# Patient Record
Sex: FEMALE | Race: WHITE | Employment: FULL TIME | URBAN - METROPOLITAN AREA
[De-identification: names, ages, dates, MRNs, and addresses within clinical notes are randomized per-mention and may not be internally consistent; named-entity substitution may affect disease eponyms.]

---

## 2020-07-25 ENCOUNTER — HOSPITAL ENCOUNTER (EMERGENCY)
Age: 50
Discharge: HOME | End: 2020-07-25
Attending: EMERGENCY MEDICINE
Payer: COMMERCIAL

## 2020-07-25 ENCOUNTER — APPOINTMENT (OUTPATIENT)
Dept: ULTRASOUND IMAGING | Age: 50
End: 2020-07-25
Payer: COMMERCIAL

## 2020-07-25 VITALS
SYSTOLIC BLOOD PRESSURE: 130 MMHG | RESPIRATION RATE: 15 BRPM | HEIGHT: 64 IN | OXYGEN SATURATION: 97 % | WEIGHT: 160 LBS | TEMPERATURE: 98.4 F | HEART RATE: 76 BPM | BODY MASS INDEX: 27.31 KG/M2 | DIASTOLIC BLOOD PRESSURE: 79 MMHG

## 2020-07-25 PROCEDURE — 99283 EMERGENCY DEPT VISIT LOW MDM: CPT

## 2020-07-25 PROCEDURE — 93971 EXTREMITY STUDY: CPT

## 2020-07-25 RX ORDER — CETIRIZINE HYDROCHLORIDE 10 MG/1
10 TABLET ORAL DAILY
COMMUNITY

## 2020-07-25 RX ORDER — ALBUTEROL SULFATE 90 UG/1
2 AEROSOL, METERED RESPIRATORY (INHALATION) EVERY 6 HOURS PRN
COMMUNITY

## 2020-07-25 ASSESSMENT — ENCOUNTER SYMPTOMS
NAUSEA: 0
ABDOMINAL PAIN: 0
COUGH: 0
SORE THROAT: 0
EYE PAIN: 0
SHORTNESS OF BREATH: 0
VOMITING: 0
DIARRHEA: 0
RHINORRHEA: 0
BACK PAIN: 0

## 2020-07-25 ASSESSMENT — PAIN DESCRIPTION - ORIENTATION: ORIENTATION: RIGHT

## 2020-07-25 ASSESSMENT — PAIN SCALES - GENERAL: PAINLEVEL_OUTOF10: 3

## 2020-07-25 ASSESSMENT — PAIN DESCRIPTION - LOCATION: LOCATION: LEG

## 2020-07-25 ASSESSMENT — PAIN DESCRIPTION - DESCRIPTORS: DESCRIPTORS: NUMBNESS;TINGLING

## 2020-07-26 NOTE — ED PROVIDER NOTES
[address] [facility] ED  [address] [facility] [address]  Phone: [phone]  Fax: [phone] [facility]          Pt Name: Jesse Farncis  MRN: [id number]  [place] 1970  Date of evaluation: 7/25/2020      CHIEF COMPLAINT       Chief Complaint   Patient presents with    Leg Pain     patient sts she was in the car traveling 8 hours. sts took 2 baby asa today. reports swelling and numbness to right leg. reports swelling bilat legs.  Leg Swelling       HISTORY OF PRESENT ILLNESS       Jesse Francis is a 54 y.o. female who presents with right leg edema and posterior knee knee pain. On Wednesday they went to [place] and return back today. She reports when she got out of the car both legs were swollen. The left went down but the right has not. Feels tight. No history of DVT or PE. Denies any trauma. States she was very active and went white water rafting and hiking on the trip to [place]. She denies other symptoms or concerns including no dyspnea or chest pain. REVIEW OF SYSTEMS       Review of Systems   Constitutional: Negative for chills, fatigue and fever. HENT: Negative for rhinorrhea and sore throat. Eyes: Negative for pain. Respiratory: Negative for cough and shortness of breath. Cardiovascular: Positive for leg swelling. Negative for chest pain and palpitations. Gastrointestinal: Negative for abdominal pain, diarrhea, nausea and vomiting. Genitourinary: Negative for difficulty urinating. Musculoskeletal: Negative for back pain and neck pain. Skin: Negative for rash. Neurological: Negative for weakness and headaches. PAST MEDICAL HISTORY    has a past medical history of Asthma and Migraine. SURGICAL HISTORY      has a past surgical history that includes Nasal fracture surgery.     CURRENT MEDICATIONS       Discharge Medication List as of 7/25/2020 11:08 PM      CONTINUE these medications which have NOT CHANGED    Details cetirizine (ZYRTEC) 10 MG tablet Take 10 mg by mouth dailyHistorical Med      albuterol sulfate HFA (VENTOLIN HFA) 108 (90 Base) MCG/ACT inhaler Inhale 2 puffs into the lungs every 6 hours as needed for WheezingHistorical Med             ALLERGIES     is allergic to sulfa antibiotics. FAMILY HISTORY     has no family status information on file. family history is not on file. SOCIAL HISTORY      reports that she has never smoked. She has never used smokeless tobacco. She reports current alcohol use. She reports that she does not use drugs. PHYSICAL EXAM     INITIAL VITALS:  height is 5' 4\" (1.626 m) and weight is 72.6 kg (160 lb). Her oral temperature is 98.4 °F (36.9 °C). Her blood pressure is 130/79 and her pulse is 76. Her respiration is 15 and oxygen saturation is 97%. Physical Exam   Constitutional: She appears well-developed and well-nourished. Non-toxic appearance. She does not appear ill. No distress. HENT:   Head: Normocephalic and atraumatic. Right Ear: External ear normal.   Left Ear: External ear normal.   Eyes: EOM and lids are normal.   Neck: No tracheal deviation present. Cardiovascular: Normal rate and regular rhythm. Pulmonary/Chest: Effort normal and breath sounds normal. No respiratory distress. Abdominal: Soft. There is no abdominal tenderness. Musculoskeletal:      Comments: I do not appreciate any obvious significant edema from the right compared to the left. Posterior knee is slightly tender. Calf is mildly tender on the right. Left leg unremarkable. No pitting edema. Otherwise unremarkable leg exam.  Normal distal pulses, motor and sensation. Neurological: She is alert. GCS eye subscore is 4. GCS verbal subscore is 5. GCS motor subscore is 6. Skin: Skin is warm and dry. Psychiatric: She has a normal mood and affect. Her speech is normal.   Vitals reviewed. DIFFERENTIAL DIAGNOSIS/ MDM:     Plan to be to check a DVT study of the legs. Suspicion low for DVT but will need to rule out. DIAGNOSTIC RESULTS     EKG: All EKG's are interpreted by the Emergency Department Physician who either signs or Co-signs this chart in the absence of a cardiologist.        RADIOLOGY:   I directly visualized the following  images and reviewed the radiologist interpretations:  US DUP LOWER EXTREMITY LEFT BERRY   Preliminary Result   No evidence of DVT in the bilateral lower extremities. US DUP LOWER EXTREMITY RIGHT BERRY   Preliminary Result   No evidence of DVT in the bilateral lower extremities. No results found. LABS:  No results found for this visit on 07/25/20. EMERGENCY DEPARTMENT COURSE:     The patient was given the following medications:  No orders of the defined types were placed in this encounter. Vitals:    Vitals:    07/25/20 2105   BP: 130/79   Pulse: 76   Resp: 15   Temp: 98.4 °F (36.9 °C)   TempSrc: Oral   SpO2: 97%   Weight: 72.6 kg (160 lb)   Height: 5' 4\" (1.626 m)     -------------------------  BP: 130/79, Temp: 98.4 °F (36.9 °C), Pulse: 76, Resp: 15      Re-evaluation Notes             OARRS    Patient doing well on reevaluation. No acute changes. Imaging negative for bilateral DVTs. Advised follow-up with her PCP return sooner if worse. Suspicion low for  DVT not detected on this image however she may need repeat study if not improving. Advised to elevate her legs and reassess in the morning. I do not feel she has a PE or require CT scan of the chest.  She is comfortable with this plan. The patient understands that at this time there is no evidence for a more malignant underlying process, but the patient also understands that early in the process of an illness or injury, an emergency department workup can be falsely reassuring.   Routine discharge counseling was given, and the patient understands that worsening, changing or persistent symptoms should prompt an immediate call or follow up with their primary physician or return to the emergency department. The importance of appropriate follow up was also discussed. I have reviewed the disposition diagnosis with the patient and or their family/guardian. I have answered their questions and given discharge instructions. They voiced understanding of these instructions and did not have any further questions or complaints. CONSULTS:    None    CRITICAL CARE:     None    PROCEDURES:    None    FINAL IMPRESSION      1. Leg swelling    2.  Left leg pain          DISPOSITION/PLAN   DISPOSITION Decision To Discharge 07/25/2020 11:08:10 PM      Condition on Disposition    Improved    PATIENT REFERRED TO:  Samuel Gallagher  Schedule an appointment as soon as possible for a visit in 3 days        DISCHARGE MEDICATIONS:  Discharge Medication List as of 7/25/2020 11:08 PM          (Please note that portions of this note were completed with a voice recognition program.  Efforts were made to edit the dictations but occasionally words are mis-transcribed.)    Justus Chavez DO  Attending Emergency Physician       Justus Chavez DO  07/26/20 8710

## 2021-07-29 ENCOUNTER — HOSPITAL ENCOUNTER (EMERGENCY)
Age: 51
Discharge: HOME | End: 2021-07-29
Attending: EMERGENCY MEDICINE
Payer: COMMERCIAL

## 2021-07-29 VITALS
OXYGEN SATURATION: 97 % | DIASTOLIC BLOOD PRESSURE: 66 MMHG | BODY MASS INDEX: 24.75 KG/M2 | HEIGHT: 64 IN | TEMPERATURE: 97.3 F | RESPIRATION RATE: 16 BRPM | SYSTOLIC BLOOD PRESSURE: 106 MMHG | WEIGHT: 145 LBS | HEART RATE: 75 BPM

## 2021-07-29 DIAGNOSIS — T78.40XA ALLERGIC REACTION, INITIAL ENCOUNTER: Primary | ICD-10-CM

## 2021-07-29 PROCEDURE — 6360000002 HC RX W HCPCS: Performed by: EMERGENCY MEDICINE

## 2021-07-29 PROCEDURE — 96375 TX/PRO/DX INJ NEW DRUG ADDON: CPT

## 2021-07-29 PROCEDURE — 99285 EMERGENCY DEPT VISIT HI MDM: CPT

## 2021-07-29 PROCEDURE — 2500000003 HC RX 250 WO HCPCS: Performed by: EMERGENCY MEDICINE

## 2021-07-29 PROCEDURE — 96374 THER/PROPH/DIAG INJ IV PUSH: CPT

## 2021-07-29 PROCEDURE — 6370000000 HC RX 637 (ALT 250 FOR IP): Performed by: EMERGENCY MEDICINE

## 2021-07-29 RX ORDER — FAMOTIDINE 20 MG/1
20 TABLET, FILM COATED ORAL 2 TIMES DAILY
Qty: 60 TABLET | Refills: 0 | Status: SHIPPED | OUTPATIENT
Start: 2021-07-29

## 2021-07-29 RX ORDER — DIPHENHYDRAMINE HYDROCHLORIDE 50 MG/ML
25 INJECTION INTRAMUSCULAR; INTRAVENOUS ONCE
Status: COMPLETED | OUTPATIENT
Start: 2021-07-29 | End: 2021-07-29

## 2021-07-29 RX ORDER — METHYLPREDNISOLONE SODIUM SUCCINATE 125 MG/2ML
125 INJECTION, POWDER, LYOPHILIZED, FOR SOLUTION INTRAMUSCULAR; INTRAVENOUS ONCE
Status: COMPLETED | OUTPATIENT
Start: 2021-07-29 | End: 2021-07-29

## 2021-07-29 RX ORDER — PREDNISONE 20 MG/1
20 TABLET ORAL 2 TIMES DAILY
Qty: 20 TABLET | Refills: 0 | Status: SHIPPED | OUTPATIENT
Start: 2021-07-29 | End: 2021-08-08

## 2021-07-29 RX ORDER — IBUPROFEN 200 MG
400 TABLET ORAL ONCE
Status: COMPLETED | OUTPATIENT
Start: 2021-07-29 | End: 2021-07-29

## 2021-07-29 RX ADMIN — FAMOTIDINE 20 MG: 10 INJECTION, SOLUTION INTRAVENOUS at 19:52

## 2021-07-29 RX ADMIN — IBUPROFEN 400 MG: 200 TABLET, FILM COATED ORAL at 20:50

## 2021-07-29 RX ADMIN — METHYLPREDNISOLONE SODIUM SUCCINATE 125 MG: 125 INJECTION, POWDER, FOR SOLUTION INTRAMUSCULAR; INTRAVENOUS at 19:52

## 2021-07-29 RX ADMIN — DIPHENHYDRAMINE HYDROCHLORIDE 25 MG: 50 INJECTION, SOLUTION INTRAMUSCULAR; INTRAVENOUS at 19:52

## 2021-07-29 ASSESSMENT — ENCOUNTER SYMPTOMS
COLOR CHANGE: 0
EYE PAIN: 0
STRIDOR: 0
SORE THROAT: 0
CONSTIPATION: 0
WHEEZING: 0
EYE REDNESS: 0
COUGH: 0
DIARRHEA: 0
EYE DISCHARGE: 0
SHORTNESS OF BREATH: 0
VOMITING: 0
NAUSEA: 0
ABDOMINAL PAIN: 0

## 2021-07-29 ASSESSMENT — PAIN SCALES - GENERAL: PAINLEVEL_OUTOF10: 4

## 2021-07-29 NOTE — ED PROVIDER NOTES
[address] [facility] ED  EMERGENCY DEPARTMENT ENCOUNTER      Pt Name: Olu Pope  MRN: [id number]  [place] 1970  Date of evaluation: 7/29/2021  Provider: Carol Ann Madrid MD    CHIEF COMPLAINT       Chief Complaint   Patient presents with    Other     states she was lightheaded and diaphoretic s/p eating at [employer], was eating a salmon salad- recently diagnosed with food allergies       HISTORY OF PRESENT ILLNESS  (Location/Symptom, Timing/Onset, Context/Setting, Quality, Duration, Modifying Factors, Severity.)   Olu Pope is a 52 y.o. female who presents to the emergency department complaining of what she suspects may be a food allergy. She was lightheaded and diaphoretic after eating at [employer]. She was eating a salmon salad. She has recently been diagnosed with several food allergies. She did have dental work done today though as well. She relates she is feeling much better now than when she initially had the episode. EMS started an IV and started some fluids on her. She relates her blood pressure was also low at the time. Nursing Notes were reviewed. REVIEW OF SYSTEMS    (2-9 systems for level 4, 10 or more for level 5)     Review of Systems   Constitutional: Negative for activity change, appetite change, chills, fatigue and fever. HENT: Negative for congestion, ear pain and sore throat. Eyes: Negative for pain, discharge and redness. Respiratory: Negative for cough, shortness of breath, wheezing and stridor. Cardiovascular: Negative for chest pain. Gastrointestinal: Negative for abdominal pain, constipation, diarrhea, nausea and vomiting. Genitourinary: Negative for decreased urine volume and difficulty urinating. Musculoskeletal: Negative for arthralgias and myalgias. Skin: Negative for color change and rash. Neurological: Positive for dizziness and light-headedness. Negative for weakness and headaches.    Psychiatric/Behavioral: Negative for behavioral Normal heart sounds. No murmur heard. Pulmonary:      Effort: Pulmonary effort is normal. No respiratory distress. Breath sounds: Normal breath sounds. No wheezing, rhonchi or rales. Chest:      Chest wall: No tenderness. Abdominal:      General: Bowel sounds are normal. There is no distension. Palpations: Abdomen is soft. There is no mass. Tenderness: There is no abdominal tenderness. There is no guarding or rebound. Musculoskeletal:         General: Normal range of motion. Cervical back: Normal range of motion and neck supple. Right lower leg: No edema. Left lower leg: No edema. Skin:     General: Skin is warm. Findings: No rash. Neurological:      General: No focal deficit present. Mental Status: She is alert and oriented to person, place, and time. Motor: No abnormal muscle tone. Psychiatric:         Mood and Affect: Mood normal.         Behavior: Behavior normal.         DIAGNOSTIC RESULTS     EKG: All EKG's are interpreted by the Emergency Department Physician who either signs or Co-signs this chart in the absence of a cardiologist.    none    RADIOLOGY:   Non-plain film images such as CT, Ultrasound and MRI are read by the radiologist. Plain radiographic images are visualized and preliminarily interpreted by the emergency physician with the below findings:    Interpretation per the Radiologist below, if available at the time of this note:    No orders to display         ED BEDSIDE ULTRASOUND:   Performed by ED Physician - none    LABS:  Labs Reviewed - No data to display    All other labs were within normal range or not returned as of this dictation.     EMERGENCY DEPARTMENT COURSE and DIFFERENTIAL DIAGNOSIS/MDM:   Vitals:    Vitals:    07/29/21 1950 07/29/21 2000 07/29/21 2015 07/29/21 2040   BP: (!) 113/57 111/69 104/65 106/66   Pulse:       Resp:       Temp:       TempSrc:       SpO2:       Weight:       Height:         We did discuss treating this as if it was an allergic reaction. She is feeling better but we will go ahead and give her some medications IV she is agreeable. I have answered any questions she has at this time. Is feeling better on reevaluation and asking to go home. CONSULTS:  None    PROCEDURES:  None    FINAL IMPRESSION      1. Allergic reaction, initial encounter          DISPOSITION/PLAN   DISPOSITION  home      PATIENT REFERRED TO:  No follow-up provider specified.     DISCHARGE MEDICATIONS:  New Prescriptions    FAMOTIDINE (PEPCID) 20 MG TABLET    Take 1 tablet by mouth 2 times daily    PREDNISONE (DELTASONE) 20 MG TABLET    Take 1 tablet by mouth 2 times daily for 10 days       (Please note that portions of this note were completed with a voice recognition program.  Efforts were made to edit the dictations but occasionally words are mis-transcribed.)    Antonio Sun MD  Attending Emergency Physician        Antonio Sun MD  07/29/21 6559

## 2021-07-30 NOTE — ED NOTES
Pt presents to ED via squad with concerns for allergic reaction to food. Pt states she was at [employer] a salmon salad and when she suddenly became lightheaded and felt faint. EMS reports she was pale and diaphoretic upon arrival. Pt states she had a similar reaction when she had allergy testing done. She is alert and oriented x4. IVF infusing. LEONARDO Atkinson RN  07/29/21 2028

## 2021-10-18 ENCOUNTER — APPOINTMENT (OUTPATIENT)
Dept: GENERAL RADIOLOGY | Age: 51
End: 2021-10-18
Payer: COMMERCIAL

## 2021-10-18 ENCOUNTER — HOSPITAL ENCOUNTER (EMERGENCY)
Age: 51
Discharge: HOME | End: 2021-10-18
Attending: EMERGENCY MEDICINE
Payer: COMMERCIAL

## 2021-10-18 VITALS
HEART RATE: 84 BPM | WEIGHT: 145 LBS | HEIGHT: 64 IN | BODY MASS INDEX: 24.75 KG/M2 | OXYGEN SATURATION: 100 % | DIASTOLIC BLOOD PRESSURE: 79 MMHG | SYSTOLIC BLOOD PRESSURE: 102 MMHG | TEMPERATURE: 97.1 F | RESPIRATION RATE: 16 BRPM

## 2021-10-18 DIAGNOSIS — S63.283A DISLOCATION OF PROXIMAL INTERPHALANGEAL JOINT OF LEFT MIDDLE FINGER, INITIAL ENCOUNTER: ICD-10-CM

## 2021-10-18 DIAGNOSIS — S62.624A CLOSED DISPLACED FRACTURE OF MIDDLE PHALANX OF RIGHT RING FINGER, INITIAL ENCOUNTER: Primary | ICD-10-CM

## 2021-10-18 DIAGNOSIS — S60.519A ABRASION OF HAND, UNSPECIFIED LATERALITY, INITIAL ENCOUNTER: ICD-10-CM

## 2021-10-18 PROCEDURE — 26755 TREAT FINGER FRACTURE EACH: CPT

## 2021-10-18 PROCEDURE — 73130 X-RAY EXAM OF HAND: CPT

## 2021-10-18 PROCEDURE — 90715 TDAP VACCINE 7 YRS/> IM: CPT | Performed by: PHYSICIAN ASSISTANT

## 2021-10-18 PROCEDURE — 90471 IMMUNIZATION ADMIN: CPT | Performed by: PHYSICIAN ASSISTANT

## 2021-10-18 PROCEDURE — 6360000002 HC RX W HCPCS: Performed by: PHYSICIAN ASSISTANT

## 2021-10-18 PROCEDURE — 99284 EMERGENCY DEPT VISIT MOD MDM: CPT

## 2021-10-18 PROCEDURE — 6370000000 HC RX 637 (ALT 250 FOR IP): Performed by: PHYSICIAN ASSISTANT

## 2021-10-18 RX ORDER — GINSENG 100 MG
CAPSULE ORAL ONCE
Status: DISCONTINUED | OUTPATIENT
Start: 2021-10-18 | End: 2021-10-18 | Stop reason: HOSPADM

## 2021-10-18 RX ORDER — HYDROCODONE BITARTRATE AND ACETAMINOPHEN 5; 325 MG/1; MG/1
1 TABLET ORAL ONCE
Status: COMPLETED | OUTPATIENT
Start: 2021-10-18 | End: 2021-10-18

## 2021-10-18 RX ORDER — IBUPROFEN 600 MG/1
600 TABLET ORAL ONCE
Status: COMPLETED | OUTPATIENT
Start: 2021-10-18 | End: 2021-10-18

## 2021-10-18 RX ADMIN — TETANUS TOXOID, REDUCED DIPHTHERIA TOXOID AND ACELLULAR PERTUSSIS VACCINE, ADSORBED 0.5 ML: 5; 2.5; 8; 8; 2.5 SUSPENSION INTRAMUSCULAR at 19:14

## 2021-10-18 RX ADMIN — HYDROCODONE BITARTRATE AND ACETAMINOPHEN 1 TABLET: 5; 325 TABLET ORAL at 20:44

## 2021-10-18 RX ADMIN — IBUPROFEN 600 MG: 600 TABLET, FILM COATED ORAL at 19:13

## 2021-10-18 ASSESSMENT — PAIN DESCRIPTION - PAIN TYPE: TYPE: ACUTE PAIN

## 2021-10-18 ASSESSMENT — PAIN SCALES - GENERAL
PAINLEVEL_OUTOF10: 9
PAINLEVEL_OUTOF10: 7
PAINLEVEL_OUTOF10: 9

## 2021-10-18 NOTE — ED TRIAGE NOTES
Patient arrives with left 2nd-4th finger and right fingers 3rd-5th. Pt was roller blading. Noted deformity to the left middle finger.

## 2021-10-18 NOTE — ED PROVIDER NOTES
[address] [facility] ED  [address] [facility] [address]  Phone: [phone]  Fax: [phone]      eMERGENCY dEPARTMENT eNCOUnter      Pt Name: Alyssia Acosta  MRN: [id number]  [place] 1970  Date of evaluation: 10/18/21      CHIEF COMPLAINT:  Chief Complaint   Patient presents with    Hand Injury       HISTORY OF PRESENT ILLNESS    Alyssia Acosta is a 52 y.o. female who presents with evaluation for orthopedic pain:    Location/Symptom:   bilat  hand  finger  pain  Timing/Onset:   JPTA  Context/Setting:   Patient here for evaluation of injuries to bilateral fingers while she was rollerblading earlier. Patient states that she did have wrist splints on but she actually fell onto flexed fingers. Patient reports some deformity of a left long finger as well as significant pain of the adjacent fingers. Patient denies any head injuries or associated neck/back/chest/abdominal/respiratory or any other new traumatic injury symptoms. No paresthesias/focal weakness. Quality:   Achy, sharp  Duration:   constant  Modifying Factors: Worse with movement and weightbearing, better with rest  Severity:   Mild-moderate    Nursing Notes were reviewed. REVIEW OF SYSTEMS       Constitutional: Denies recent fever, chills. Eyes: No vision changes. Neck: No neck pain. Respiratory: Denies recent shortness of breath. Cardiac:  Denies recent chest pain. GI:  Denies abdominal pain/nausea/vomiting/diarrhea. : Denies dysuria. Musculoskeletal:   Per HPI  Neurologic:  No headache. No focal weakness. No paresthesias. Skin:  Denies any rash. Negative in 10 essential Systems except as mentioned above and in the HPI. PAST MEDICAL HISTORY   PMH:  has a past medical history of Asthma, Migraine, and Multiple food allergies. Surgical History:  has a past surgical history that includes Nasal fracture surgery. Social History:  reports that she has never smoked.  She has never used smokeless tobacco. She reports current alcohol use. She reports that she does not use drugs. Family History: None  Psychiatric History: None    Allergies:is allergic to ivania flavor, milk-related compounds, nuts [peanut-containing drug products], other, soybean-containing drug products, and sulfa antibiotics. PHYSICAL EXAM     INITIAL VITALS: /79   Pulse 84   Temp 97.1 °F (36.2 °C) (Infrared)   Resp 16   Ht 5' 4\" (1.626 m)   Wt 65.8 kg (145 lb)   SpO2 100%   BMI 24.89 kg/m²     Constitutional:  Well developed   Eyes:  Pupils equal/round  HENT:  Atraumatic, nontender scalp and midline cervical spine. External ears normal, nose normal  Respiratory:  LCTA bilat, no W/R/R  Cardiovascular:  RRR with normal S1 and S2  Musculoskeletal: Left long finger PIP joint dislocated, no ischemic changes. Adjacent fingers are mildly bruised with some scattered abrasions. Right ring finger PIP joint is edematous and very tender to palpation. Limited range of motion there. Adjacent fingers of the right hand to are mildly bruised with some scattered abrasions as well. No other deformities or tenderness deficits appreciated. Bilateral wrists nontender and full range of motion. NV intact distally. No signs of acute limb ischemia. Back:  No midline or CVA tenderness. Integument:   No rash. Neurologic:  Alert & appropriate mentation/interaction, no focal deficits noted     DIAGNOSTIC RESULTS     EKG: All EKG's are interpreted by the Emergency Department Physician who either signs or Co-signs this chart in the absence of a cardiologist.  Not indicated    RADIOLOGY:   Reviewed the radiologist:  XR HAND LEFT (MIN 3 VIEWS)   Final Result   Soft tissue swelling of the left middle finger as above. No acute fracture or dislocation. XR HAND RIGHT (MIN 3 VIEWS)   Final Result   Small avulsion fracture involving the base of the middle 4th phalanx.                  LABS:  Labs Reviewed - No data to display  Not indicated    EMERGENCY DEPARTMENT COURSE / MDM:     Joint Reduction Procedure Note    Indication: Joint dislocation    Consent: The patient provided verbal consent for this procedure. Procedure: The pre-reduction exam showed distal perfusion & neurologic function to be normal. The patient was placed in a seated position. Anesthesia/pain control was offered but the patient refused. Reduction of the left long (middle) finger PIP joint was performed by traction and counter traction. Post reduction films were obtained and revealed satisfactory reduction. A post-reduction exam revealed distal perfusion & neurologic function to be normal. The affected area and adjacent fingers were immobilized with a volar wrist splint. Right ring finger with status alumifoam splint. The patient tolerated the procedure well. Complications: None    I have reviewed the disposition diagnosis with the patient and or their family/guardian. I have answered their questions and given discharge instructions. They voiced understanding of these instructions and did not have any further questions or complaints. Hands washed and abx  Ointment applied. Pt will f/u with [address] [place] [address] Surgeons as already established there. I also provided Hand Surgeon follow up as well. Orders Placed This Encounter   Medications    ibuprofen (ADVIL;MOTRIN) tablet 600 mg    Tetanus-Diphth-Acell Pertussis (BOOSTRIX) injection 0.5 mL    bacitracin ointment    HYDROcodone-acetaminophen (NORCO) 5-325 MG per tablet 1 tablet       CONSULTS:  None      FINAL IMPRESSION      1. Closed displaced fracture of middle phalanx of right ring finger, initial encounter    2. Dislocation of proximal interphalangeal joint of left middle finger, initial encounter    3.  Abrasion of hand, unspecified laterality, initial encounter          DISPOSITION/PLAN:  DISPOSITION Decision To Discharge 10/18/2021 07:39:57 PM        PATIENT REFERRED TO:  Nicolas Burt, MD [place] [address]  [phone]    Schedule an appointment as soon as possible for a visit   For fracture management    Florence Davies MD  [address] [place] [id number] [phone]    Schedule an appointment as soon as possible for a visit   For fracture management    [facility] ED  [address]   [place] [address]  [phone]  Go to   for worsening of symptoms      DISCHARGE MEDICATIONS:  Discharge Medication List as of 10/18/2021  8:38 PM          (Please note that portions of this note were completed with a voice recognition program.  Efforts were made to edit the dictations but occasionally words are mis-transcribed.)    MALAIKA Matta PA-C  10/18/21 1165

## 2021-10-19 PROBLEM — S63.283A DISLOCATION OF PROXIMAL INTERPHALANGEAL JOINT OF LEFT MIDDLE FINGER: Status: ACTIVE | Noted: 2021-10-19

## 2021-10-19 PROBLEM — S62.614A CLOSED DISPLACED FRACTURE OF PROXIMAL PHALANX OF RIGHT RING FINGER: Status: ACTIVE | Noted: 2021-10-19

## 2021-10-19 NOTE — ED PROVIDER NOTES
Emergency Department     Faculty Attestation    I performed a history and physical examination of the patient and discussed management with the mid level provideer. I reviewed the mid level provider's note and agree with the documented findings and plan of care. Any areas of disagreement are noted on the chart. I was personally present for the key portions of any procedures. I have documented in the chart those procedures where I was not present during the key portions. I have reviewed the emergency nurses triage note. I agree with the chief complaint, past medical history, past surgical history, allergies, medications, social and family history as documented unless otherwise noted below. Documentation of the HPI, Physical Exam and Medical Decision Making performed by medical students or scribes is based on my personal performance of the HPI, PE and MDM. For Physician Assistant/ Nurse Practitioner cases/documentation I have personally evaluated this patient and have completed at least one if not all key elements of the E/M (history, physical exam, and MDM). Additional findings are as noted. Primary Care Physician:  No primary care provider on file. CHIEF COMPLAINT       Chief Complaint   Patient presents with    Hand Injury       RECENT VITALS:   Temp: 97.1 °F (36.2 °C),  Pulse: 84, Resp: 16, BP: 102/79    LABS:  Labs Reviewed - No data to display      PERTINENT ATTENDING PHYSICIAN COMMENTS:    44-year-old female patient presents for evaluation of injuries to bilateral fingers while she was rollerblading. Patient states she fell onto the flexed fingers. She denies any head injury or loss of consciousness and denies any headache, neck pain or back pain. She denies any chest or abdominal injuries. Her work-up reveals dislocation of the left middle finger at PIP joint and edema with tenderness at the right ring finger PIP joint. The postreduction films of the left middle finger are unremarkable. X-rays of the right hand reveals a small avulsion fracture involving the base of the fourth middle phalanx. Finger splint as well as volar splint were applied by physician assistant and closely supervised by myself. Neurovascular examination is intact after splint application. Patient was referred to hand surgeon on call for follow-up. Patient is advised to call for appointment, return if worse.        Hanh Quintanilla MD  10/19/21 6084

## 2024-02-12 ENCOUNTER — HOSPITAL ENCOUNTER (OUTPATIENT)
Dept: ULTRASOUND IMAGING | Age: 54
Discharge: HOME | End: 2024-02-14
Payer: COMMERCIAL

## 2024-02-12 DIAGNOSIS — E04.9 ENLARGEMENT OF THYROID: ICD-10-CM

## 2024-02-12 PROCEDURE — 76536 US EXAM OF HEAD AND NECK: CPT
